# Patient Record
Sex: MALE | Race: WHITE | Employment: OTHER | URBAN - METROPOLITAN AREA
[De-identification: names, ages, dates, MRNs, and addresses within clinical notes are randomized per-mention and may not be internally consistent; named-entity substitution may affect disease eponyms.]

---

## 2017-06-09 ENCOUNTER — HOSPITAL ENCOUNTER (OUTPATIENT)
Dept: SURGERY | Age: 32
Discharge: HOME | End: 2017-06-09

## 2017-06-09 VITALS
SYSTOLIC BLOOD PRESSURE: 142 MMHG | RESPIRATION RATE: 16 BRPM | TEMPERATURE: 97.9 F | OXYGEN SATURATION: 97 % | BODY MASS INDEX: 29.8 KG/M2 | DIASTOLIC BLOOD PRESSURE: 75 MMHG | HEART RATE: 64 BPM | HEIGHT: 72 IN | WEIGHT: 220 LBS

## 2017-06-09 NOTE — PERIOP NOTES
Patient verified name, , and surgery as listed in [facility]. TYPE  CASE:1b  Orders per surgeon: yes Received  Labs per surgeon:none: results -  Labs per anesthesia protocol: none : results -  EKG  :  na      Patient provided with handouts including guide to surgery , transfusions, pain management and hand hygiene for the family and community. Pt verbalizes understanding of all pre-op instructions . Instructed that family must be present in building at all times. Nothing to eat or drink after midnight the night prior to surgery. Hibiclens and instructions given per hospital policy. Instructed patient to continue  previous medications as prescribed prior to surgery and  to take the following medications the day of surgery according to anesthesia guidelines : none       Original medication prescription bottles none visualized during patient appointment. Continue all previous medications unless otherwise directed. Instructed patient to hold  the following medications prior to surgery: none      Patient verbalized understanding of all instructions and provided all medical/health information to the best of their ability.

## 2017-06-12 ENCOUNTER — ANESTHESIA EVENT (OUTPATIENT)
Dept: SURGERY | Age: 32
End: 2017-06-12
Payer: SELF-PAY

## 2017-06-13 ENCOUNTER — ANESTHESIA (OUTPATIENT)
Dept: SURGERY | Age: 32
End: 2017-06-13
Payer: SELF-PAY

## 2017-06-13 ENCOUNTER — HOSPITAL ENCOUNTER (OUTPATIENT)
Age: 32
Setting detail: OUTPATIENT SURGERY
Discharge: HOME | End: 2017-06-13
Attending: OPHTHALMOLOGY | Admitting: OPHTHALMOLOGY
Payer: SELF-PAY

## 2017-06-13 VITALS
HEART RATE: 78 BPM | SYSTOLIC BLOOD PRESSURE: 131 MMHG | OXYGEN SATURATION: 97 % | BODY MASS INDEX: 29.97 KG/M2 | RESPIRATION RATE: 15 BRPM | WEIGHT: 221 LBS | DIASTOLIC BLOOD PRESSURE: 73 MMHG | TEMPERATURE: 97.8 F

## 2017-06-13 PROCEDURE — 77030020143 HC AIRWY LARYN INTUB CGAS -A: Performed by: ANESTHESIOLOGY

## 2017-06-13 PROCEDURE — 74011250636 HC RX REV CODE- 250/636

## 2017-06-13 PROCEDURE — 77030008547 HC TBNG OPHTH BD -A: Performed by: OPHTHALMOLOGY

## 2017-06-13 PROCEDURE — 74011000250 HC RX REV CODE- 250: Performed by: OPHTHALMOLOGY

## 2017-06-13 PROCEDURE — 77030018837 HC SOL IRR OPTH ALCN -A: Performed by: OPHTHALMOLOGY

## 2017-06-13 PROCEDURE — 77030032623 HC KT VITRCMY TOT PLS ALCN -F: Performed by: OPHTHALMOLOGY

## 2017-06-13 PROCEDURE — 76060000033 HC ANESTHESIA 1 TO 1.5 HR: Performed by: OPHTHALMOLOGY

## 2017-06-13 PROCEDURE — 74011000250 HC RX REV CODE- 250

## 2017-06-13 PROCEDURE — 77030012829 HC CAUT BPLR KIRW -B: Performed by: OPHTHALMOLOGY

## 2017-06-13 PROCEDURE — 74011250637 HC RX REV CODE- 250/637: Performed by: ANESTHESIOLOGY

## 2017-06-13 PROCEDURE — 77030018838 HC SOL IRR OPTH ALCN -B: Performed by: OPHTHALMOLOGY

## 2017-06-13 PROCEDURE — 77030017621 HC NDL OPHTH1 ALCN -B: Performed by: OPHTHALMOLOGY

## 2017-06-13 PROCEDURE — 76210000063 HC OR PH I REC FIRST 0.5 HR: Performed by: OPHTHALMOLOGY

## 2017-06-13 PROCEDURE — 77030018846 HC SOL IRR STRL H20 ICUM -A: Performed by: OPHTHALMOLOGY

## 2017-06-13 PROCEDURE — 76210000020 HC REC RM PH II FIRST 0.5 HR: Performed by: OPHTHALMOLOGY

## 2017-06-13 PROCEDURE — 74011250636 HC RX REV CODE- 250/636: Performed by: OPHTHALMOLOGY

## 2017-06-13 PROCEDURE — 76010000161 HC OR TIME 1 TO 1.5 HR INTENSV-TIER 1: Performed by: OPHTHALMOLOGY

## 2017-06-13 PROCEDURE — 77030032490 HC SLV COMPR SCD KNE COVD -B: Performed by: OPHTHALMOLOGY

## 2017-06-13 PROCEDURE — 74011250636 HC RX REV CODE- 250/636: Performed by: ANESTHESIOLOGY

## 2017-06-13 RX ORDER — SODIUM CHLORIDE 9 MG/ML
INJECTION INTRAMUSCULAR; INTRAVENOUS; SUBCUTANEOUS AS NEEDED
Status: DISCONTINUED | OUTPATIENT
Start: 2017-06-13 | End: 2017-06-13 | Stop reason: HOSPADM

## 2017-06-13 RX ORDER — CYCLOPENTOLATE HYDROCHLORIDE 20 MG/ML
1 SOLUTION/ DROPS OPHTHALMIC
Status: DISCONTINUED | OUTPATIENT
Start: 2017-06-13 | End: 2017-06-13 | Stop reason: HOSPADM

## 2017-06-13 RX ORDER — LIDOCAINE HYDROCHLORIDE 20 MG/ML
INJECTION, SOLUTION INFILTRATION; PERINEURAL AS NEEDED
Status: DISCONTINUED | OUTPATIENT
Start: 2017-06-13 | End: 2017-06-13 | Stop reason: HOSPADM

## 2017-06-13 RX ORDER — PHENYLEPHRINE HYDROCHLORIDE 25 MG/ML
1 SOLUTION/ DROPS OPHTHALMIC
Status: DISCONTINUED | OUTPATIENT
Start: 2017-06-13 | End: 2017-06-13 | Stop reason: HOSPADM

## 2017-06-13 RX ORDER — OFLOXACIN 3 MG/ML
1 SOLUTION/ DROPS OPHTHALMIC
Status: DISCONTINUED | OUTPATIENT
Start: 2017-06-13 | End: 2017-06-13 | Stop reason: HOSPADM

## 2017-06-13 RX ORDER — BETAMETHASONE SODIUM PHOSPHATE AND BETAMETHASONE ACETATE 3; 3 MG/ML; MG/ML
INJECTION, SUSPENSION INTRA-ARTICULAR; INTRALESIONAL; INTRAMUSCULAR; SOFT TISSUE AS NEEDED
Status: DISCONTINUED | OUTPATIENT
Start: 2017-06-13 | End: 2017-06-13 | Stop reason: HOSPADM

## 2017-06-13 RX ORDER — TROPICAMIDE 10 MG/ML
1 SOLUTION/ DROPS OPHTHALMIC
Status: DISCONTINUED | OUTPATIENT
Start: 2017-06-13 | End: 2017-06-13 | Stop reason: HOSPADM

## 2017-06-13 RX ORDER — PROPOFOL 10 MG/ML
INJECTION, EMULSION INTRAVENOUS AS NEEDED
Status: DISCONTINUED | OUTPATIENT
Start: 2017-06-13 | End: 2017-06-13 | Stop reason: HOSPADM

## 2017-06-13 RX ORDER — KETOROLAC TROMETHAMINE 30 MG/ML
30 INJECTION, SOLUTION INTRAMUSCULAR; INTRAVENOUS AS NEEDED
Status: DISCONTINUED | OUTPATIENT
Start: 2017-06-13 | End: 2017-06-13 | Stop reason: HOSPADM

## 2017-06-13 RX ORDER — LIDOCAINE HYDROCHLORIDE 10 MG/ML
0.1 INJECTION INFILTRATION; PERINEURAL AS NEEDED
Status: DISCONTINUED | OUTPATIENT
Start: 2017-06-13 | End: 2017-06-13 | Stop reason: HOSPADM

## 2017-06-13 RX ORDER — ONDANSETRON 2 MG/ML
INJECTION INTRAMUSCULAR; INTRAVENOUS AS NEEDED
Status: DISCONTINUED | OUTPATIENT
Start: 2017-06-13 | End: 2017-06-13 | Stop reason: HOSPADM

## 2017-06-13 RX ORDER — HYDROMORPHONE HYDROCHLORIDE 2 MG/ML
0.5 INJECTION, SOLUTION INTRAMUSCULAR; INTRAVENOUS; SUBCUTANEOUS
Status: DISCONTINUED | OUTPATIENT
Start: 2017-06-13 | End: 2017-06-13 | Stop reason: HOSPADM

## 2017-06-13 RX ORDER — CEFAZOLIN SODIUM 1 G/3ML
INJECTION, POWDER, FOR SOLUTION INTRAMUSCULAR; INTRAVENOUS AS NEEDED
Status: DISCONTINUED | OUTPATIENT
Start: 2017-06-13 | End: 2017-06-13 | Stop reason: HOSPADM

## 2017-06-13 RX ORDER — NALOXONE HYDROCHLORIDE 0.4 MG/ML
0.1 INJECTION, SOLUTION INTRAMUSCULAR; INTRAVENOUS; SUBCUTANEOUS AS NEEDED
Status: DISCONTINUED | OUTPATIENT
Start: 2017-06-13 | End: 2017-06-13 | Stop reason: HOSPADM

## 2017-06-13 RX ORDER — FENTANYL CITRATE 50 UG/ML
INJECTION, SOLUTION INTRAMUSCULAR; INTRAVENOUS AS NEEDED
Status: DISCONTINUED | OUTPATIENT
Start: 2017-06-13 | End: 2017-06-13 | Stop reason: HOSPADM

## 2017-06-13 RX ORDER — SODIUM CHLORIDE 0.9 % (FLUSH) 0.9 %
5-10 SYRINGE (ML) INJECTION AS NEEDED
Status: DISCONTINUED | OUTPATIENT
Start: 2017-06-13 | End: 2017-06-13 | Stop reason: HOSPADM

## 2017-06-13 RX ORDER — BUPIVACAINE HYDROCHLORIDE 5 MG/ML
INJECTION, SOLUTION EPIDURAL; INTRACAUDAL AS NEEDED
Status: DISCONTINUED | OUTPATIENT
Start: 2017-06-13 | End: 2017-06-13 | Stop reason: HOSPADM

## 2017-06-13 RX ORDER — SODIUM CHLORIDE 0.9 % (FLUSH) 0.9 %
5-10 SYRINGE (ML) INJECTION EVERY 8 HOURS
Status: DISCONTINUED | OUTPATIENT
Start: 2017-06-13 | End: 2017-06-13 | Stop reason: HOSPADM

## 2017-06-13 RX ORDER — SODIUM CHLORIDE, SODIUM LACTATE, POTASSIUM CHLORIDE, CALCIUM CHLORIDE 600; 310; 30; 20 MG/100ML; MG/100ML; MG/100ML; MG/100ML
125 INJECTION, SOLUTION INTRAVENOUS CONTINUOUS
Status: DISCONTINUED | OUTPATIENT
Start: 2017-06-13 | End: 2017-06-13 | Stop reason: HOSPADM

## 2017-06-13 RX ORDER — LIDOCAINE HYDROCHLORIDE 20 MG/ML
INJECTION, SOLUTION EPIDURAL; INFILTRATION; INTRACAUDAL; PERINEURAL AS NEEDED
Status: DISCONTINUED | OUTPATIENT
Start: 2017-06-13 | End: 2017-06-13 | Stop reason: HOSPADM

## 2017-06-13 RX ORDER — OXYCODONE HYDROCHLORIDE 5 MG/1
10 TABLET ORAL
Status: DISCONTINUED | OUTPATIENT
Start: 2017-06-13 | End: 2017-06-13 | Stop reason: HOSPADM

## 2017-06-13 RX ORDER — PREDNISOLONE ACETATE 10 MG/ML
SUSPENSION/ DROPS OPHTHALMIC AS NEEDED
Status: DISCONTINUED | OUTPATIENT
Start: 2017-06-13 | End: 2017-06-13 | Stop reason: HOSPADM

## 2017-06-13 RX ORDER — MIDAZOLAM HYDROCHLORIDE 1 MG/ML
2 INJECTION, SOLUTION INTRAMUSCULAR; INTRAVENOUS
Status: DISCONTINUED | OUTPATIENT
Start: 2017-06-13 | End: 2017-06-13 | Stop reason: HOSPADM

## 2017-06-13 RX ORDER — ACETAMINOPHEN 500 MG
1000 TABLET ORAL ONCE
Status: DISCONTINUED | OUTPATIENT
Start: 2017-06-13 | End: 2017-06-13 | Stop reason: HOSPADM

## 2017-06-13 RX ADMIN — FENTANYL CITRATE 50 MCG: 50 INJECTION, SOLUTION INTRAMUSCULAR; INTRAVENOUS at 08:28

## 2017-06-13 RX ADMIN — FENTANYL CITRATE 25 MCG: 50 INJECTION, SOLUTION INTRAMUSCULAR; INTRAVENOUS at 09:00

## 2017-06-13 RX ADMIN — FENTANYL CITRATE 25 MCG: 50 INJECTION, SOLUTION INTRAMUSCULAR; INTRAVENOUS at 08:41

## 2017-06-13 RX ADMIN — ONDANSETRON 4 MG: 2 INJECTION INTRAMUSCULAR; INTRAVENOUS at 09:33

## 2017-06-13 RX ADMIN — OXYCODONE HYDROCHLORIDE 10 MG: 5 TABLET ORAL at 10:21

## 2017-06-13 RX ADMIN — SODIUM CHLORIDE, SODIUM LACTATE, POTASSIUM CHLORIDE, AND CALCIUM CHLORIDE 125 ML/HR: 600; 310; 30; 20 INJECTION, SOLUTION INTRAVENOUS at 07:00

## 2017-06-13 RX ADMIN — OFLOXACIN 1 DROP: 3 SOLUTION OPHTHALMIC at 06:59

## 2017-06-13 RX ADMIN — LIDOCAINE HYDROCHLORIDE 80 MG: 20 INJECTION, SOLUTION EPIDURAL; INFILTRATION; INTRACAUDAL; PERINEURAL at 08:28

## 2017-06-13 RX ADMIN — CYCLOPENTOLATE HYDROCHLORIDE 1 DROP: 20 SOLUTION/ DROPS OPHTHALMIC at 06:59

## 2017-06-13 RX ADMIN — PROPOFOL 250 MG: 10 INJECTION, EMULSION INTRAVENOUS at 08:29

## 2017-06-13 RX ADMIN — PHENYLEPHRINE HYDROCHLORIDE 1 DROP: 25 SOLUTION/ DROPS OPHTHALMIC at 06:59

## 2017-06-13 RX ADMIN — TROPICAMIDE 1 DROP: 10 SOLUTION/ DROPS OPHTHALMIC at 06:59

## 2017-06-13 NOTE — IP AVS SNAPSHOT
Summary of Care Report The Summary of Care report has been created to help improve care coordination. Users with access to [employer] or [address] (Web-based application) may access additional patient information including the Discharge Summary. If you are not currently a [address] user and need more information, please call the number listed below in the [place] [address] section and ask to be connected with Medical Records. Facility Information Name Address Phone [address] [address] [address] [phone] Patient Information Patient Name Sex THUY Perdomo ([id number]) Male 1985 Discharge Information Admitting Provider Service Area Unit No Marte MD / [facility] Liliane / [phone] Discharge Provider Discharge Date/Time Discharge Disposition Destination (none) (none) (none) (none) Patient Language Language ENGLISH [13] Hospital Problems as of 2017  Reviewed: 2017  6:48 AM by Garett Reyes MD  
 None Non-Hospital Problems as of 2017  Reviewed: 2017  6:48 AM by Garett Reyes MD  
 None You are allergic to the following Allergen Reactions Amoxil (Amoxicillin) Nausea and Vomiting Current Discharge Medication List  
  
Notice You have not been prescribed any medications. Surgery Information ID Date/Time Status Primary Surgeon All Procedures Location [id number] 2017 0830 Unposted Jaida Garcia III, MD RIGHT VITRECTOMY POSTERIOR 22 GAUGE / LASER/ SCAR TISSUE REMOVAL/ GAS FLUID EXCHANGE/COMPLEX RETINAL DETACHMENT REPAIR [facility] Follow-up Information None Discharge Instructions [facility] [place], [place] 
 Hortense Butte Marjean Krabbe, MD Lillia Monks Renfro, MD Octaviano Eagles. MD Morteza Ortiz. Dominick Atkinson, [address] or [phone] or [phone] or [phone] Post Operative Instructions for Retina and Vitreous Surgery Patients The following are a few guidelines that you should observe for two weeks after surgery: 1. Avoid stooping over, lifting heavy weights or bumping your head. 2.  Special positioning: MAY SIT UP, BUT KEEP HEAD DOWN. SLEEP ON LEFT SIDE WITH FACE TURNED DOWN 
 
3. No extensive traveling except what is necessary. A gas air bubble was put in your eye at surgery - avoid air travel until you consult your doctor. 4.  You may shave after the third day. 5.  You may watch television, read, cook and wash dishes as long as it does not interfere 
      with special positioning instructions. 6.  Alcoholic beverages may be used in moderation. 7.  No sexual intercourse. 8.  You  may bathe the eyelids daily with cotton or a tissue moistened with warm tap 
     water. Do not bathe the eyeball itself. 9.  Some discharge from the operated eye is to be expected. This should gradually  
     improve. 10. Change the eye pad at least once daily. You may discontinue the eye pad whenever 
      comfortable to do so (usually three days after the operation). Wear the eye shield or 
      glasses at all times. 11. If you experience increasing pain, decreasing vision, or pus like discharge, call the 
      Office. 12. Medication Instructions: 
       Prednisolone 1% - one drop in the operated eye __4__ times a day. _Ofloxacin_(antibiotic drop) - one drop in operated eye 4 times a day. BRING ALL EYE DROPS TO YOU POSTOPERATIVE APPOINTMENT! 13. Return appointment at the [facility] on: 6/14/17 at 8:05AM w/ Dr. Jerome Camarillo Voiced or demonstrated understanding:  Aruna Hussein from Nurse PATIENT INSTRUCTIONS: 
 
After general anesthesia or intravenous sedation, for 24 hours or while taking prescription Narcotics: · Limit your activities · Do not drive and operate hazardous machinery · Do not make important personal or business decisions · Do  not drink alcoholic beverages · If you have not urinated within 8 hours after discharge, please contact your surgeon on call. *  Please give a list of your current medications to your Primary Care Provider. *  Please update this list whenever your medications are discontinued, doses are 
    changed, or new medications (including over-the-counter products) are added. *  Please carry medication information at all times in case of emergency situations. These are general instructions for a healthy lifestyle: No smoking/ No tobacco products/ Avoid exposure to second hand smoke Surgeon General's Warning:  Quitting smoking now greatly reduces serious risk to your health. Obesity, smoking, and sedentary lifestyle greatly increases your risk for illness A healthy diet, regular physical exercise & weight monitoring are important for maintaining a healthy lifestyle You may be retaining fluid if you have a history of heart failure or if you experience any of the following symptoms:  Weight gain of 3 pounds or more overnight or 5 pounds in a week, increased swelling in our hands or feet or shortness of breath while lying flat in bed. Please call your doctor as soon as you notice any of these symptoms; do not wait until your next office visit. Recognize signs and symptoms of STROKE: 
 
F-face looks uneven A-arms unable to move or move unevenly S-speech slurred or non-existent T-time-call 911 as soon as signs and symptoms begin-DO NOT go Back to bed or wait to see if you get better-TIME IS BRAIN. Chart Review Routing History No Routing History on File

## 2017-06-13 NOTE — BRIEF OP NOTE
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, right eye [H33.021] proliferative retinopathy right eye    Procedure(s):  RIGHT VITRECTOMY POSTERIOR 25 GAUGE / LASER/ SCAR TISSUE REMOVAL/ GAS FLUID EXCHANGE/COMPLEX RETINAL DETACHMENT REPAIR  Surgeon(s) and Role:     * Curtis Kemp III, MD - Primary         Assistant Staff:       Surgical Staff:  Circ-1: Gerson Osborne RN  Scrub Tech-1: Kimberly Lemons  Event Time In   Incision Start 5310   Incision Close 8100     Anesthesia: General   Estimated Blood Loss: None  Specimens: * No specimens in log *   Findings: Chronic inferior retinal detachment with multiple radial subretinal fibrotic bands right eye   Complications: None  Implants: * No implants in log *

## 2017-06-13 NOTE — IP AVS SNAPSHOT
Yumiko Foil 
 
 
 [address] [facility] 
[phone] Patient: Raghu Clark MRN: [id number] :1985 You are allergic to the following Allergen Reactions Amoxil (Amoxicillin) Nausea and Vomiting Recent Documentation Weight BMI Smoking Status  
  
  
  
 100.2 kg 29.97 kg/m2 Current Every Day Smoker Emergency Contacts Name Discharge Info Relation Home Work Mobile [address]  Parent [1] [phone] About your hospitalization You were admitted on:  2017 You last received care in the:  [place] [address] You were discharged on:  2017 Unit phone number:  [phone] Why you were hospitalized Your primary diagnosis was:  Not on File Providers Seen During Your Hospitalizations Provider Role Specialty Primary office phone Rogelia Goldberg, MD Attending Provider Ophthalmology [phone] Your Primary Care Physician (PCP) Primary Care Physician Office Phone Office Fax OTHER, PHYS ** None ** ** None ** Follow-up Information None Current Discharge Medication List  
  
Notice You have not been prescribed any medications. Discharge Instructions [facility] [place] MD Dereck Riggs MD Marygrace Belling. MD Jean Marie Martinez. Nitin Og, [address] or [phone] or [phone] or [phone] Post Operative Instructions for Retina and Vitreous Surgery Patients The following are a few guidelines that you should observe for two weeks after surgery: 1. Avoid stooping over, lifting heavy weights or bumping your head. 2.  Special positioning: MAY SIT UP, BUT KEEP HEAD DOWN.  SLEEP ON LEFT SIDE WITH FACE TURNED DOWN 
 
 3.  No extensive traveling except what is necessary. A gas air bubble was put in your eye at surgery - avoid air travel until you consult your doctor. 4.  You may shave after the third day. 5.  You may watch television, read, cook and wash dishes as long as it does not interfere 
      with special positioning instructions. 6.  Alcoholic beverages may be used in moderation. 7.  No sexual intercourse. 8.  You  may bathe the eyelids daily with cotton or a tissue moistened with warm tap 
     water. Do not bathe the eyeball itself. 9.  Some discharge from the operated eye is to be expected. This should gradually  
     improve. 10. Change the eye pad at least once daily. You may discontinue the eye pad whenever 
      comfortable to do so (usually three days after the operation). Wear the eye shield or 
      glasses at all times. 11. If you experience increasing pain, decreasing vision, or pus like discharge, call the 
      Office. 12. Medication Instructions: 
       Prednisolone 1% - one drop in the operated eye __4__ times a day. _Ofloxacin_(antibiotic drop) - one drop in operated eye 4 times a day. BRING ALL EYE DROPS TO YOU POSTOPERATIVE APPOINTMENT! 13. Return appointment at the [facility] on: 6/14/17 at 8:05AM w/ Dr. Munir Zaragoza Voiced or demonstrated understanding:  Nakita Chambers from Nurse PATIENT INSTRUCTIONS: 
 
After general anesthesia or intravenous sedation, for 24 hours or while taking prescription Narcotics: · Limit your activities · Do not drive and operate hazardous machinery · Do not make important personal or business decisions · Do  not drink alcoholic beverages · If you have not urinated within 8 hours after discharge, please contact your surgeon on call. *  Please give a list of your current medications to your Primary Care Provider.  
 
*  Please update this list whenever your medications are discontinued, doses are 
    changed, or new medications (including over-the-counter products) are added. *  Please carry medication information at all times in case of emergency situations. These are general instructions for a healthy lifestyle: No smoking/ No tobacco products/ Avoid exposure to second hand smoke Surgeon General's Warning:  Quitting smoking now greatly reduces serious risk to your health. Obesity, smoking, and sedentary lifestyle greatly increases your risk for illness A healthy diet, regular physical exercise & weight monitoring are important for maintaining a healthy lifestyle You may be retaining fluid if you have a history of heart failure or if you experience any of the following symptoms:  Weight gain of 3 pounds or more overnight or 5 pounds in a week, increased swelling in our hands or feet or shortness of breath while lying flat in bed. Please call your doctor as soon as you notice any of these symptoms; do not wait until your next office visit. Recognize signs and symptoms of STROKE: 
 
F-face looks uneven A-arms unable to move or move unevenly S-speech slurred or non-existent T-time-call 911 as soon as signs and symptoms begin-DO NOT go Back to bed or wait to see if you get better-TIME IS BRAIN. Discharge Orders None Introducing [facility]! Brian Tomas introduces [email or web address] patient portal. Now you can access parts of your medical record, email your doctor's office, and request medication refills online. 1. In your internet browser, go to [email or web address] 2. Click on the First Time User? Click Here link in the Sign In box. You will see the New Member Sign Up page. 3. Enter your [email or web address] Access Code exactly as it appears below. You will not need to use this code after youve completed the sign-up process. If you do not sign up before the expiration date, you must request a new code. · [employer] Access Code: [email or web address] Expires: 9/4/2017  9:27 AM 
 
4. Enter the last four digits of your Social Security Number (xxxx) and Date of Birth (mm/dd/yyyy) as indicated and click Submit. You will be taken to the next sign-up page. 5. Create a [employer] ID. This will be your [employer] login ID and cannot be changed, so think of one that is secure and easy to remember. 6. Create a [employer] password. You can change your password at any time. 7. Enter your Password Reset Question and Answer. This can be used at a later time if you forget your password. 8. Enter your e-mail address. You will receive e-mail notification when new information is available in [address] 9. Click Sign Up. You can now view and download portions of your medical record. 10. Click the Download Summary menu link to download a portable copy of your medical information. If you have questions, please visit the Frequently Asked Questions section of the [employer] website. Remember, [employer] is NOT to be used for urgent needs. For medical emergencies, dial 911. Now available from your iPhone and Android! General Information Please provide this summary of care documentation to your next provider. Patient Signature:  ____________________________________________________________ Date:  ____________________________________________________________  
  
Yvette Reyna Provider Signature:  ____________________________________________________________ Date:  ____________________________________________________________

## 2017-06-13 NOTE — ANESTHESIA POSTPROCEDURE EVALUATION
Post-Anesthesia Evaluation and Assessment    Patient: Mindi Concepcion MRN: [id number]  SSN: [id number]    YOB: 1985  Age: 28 y.o. Sex: male       Cardiovascular Function/Vital Signs  Visit Vitals    /77    Pulse 76    Temp 36.6 °C (97.8 °F)    Resp 16    Wt 100.2 kg (221 lb)    SpO2 96%    BMI 29.97 kg/m2       Patient is status post general anesthesia for Procedure(s):  RIGHT VITRECTOMY POSTERIOR 25 GAUGE / LASER/ SCAR TISSUE REMOVAL/ GAS FLUID EXCHANGE/COMPLEX RETINAL DETACHMENT REPAIR. Nausea/Vomiting: None    Postoperative hydration reviewed and adequate. Pain:  Pain Scale 1: Numeric (0 - 10) (06/13/17 1011)  Pain Intensity 1: 6 (06/13/17 1011)   Managed    Neurological Status:   Neuro (WDL): Within Defined Limits (06/13/17 1011)  Neuro  Neurologic State: Alert (06/13/17 1011)  LUE Motor Response: Purposeful (06/13/17 1011)  LLE Motor Response: Purposeful (06/13/17 1011)  RUE Motor Response: Purposeful (06/13/17 1011)  RLE Motor Response: Purposeful (06/13/17 1011)   At baseline    Mental Status and Level of Consciousness: Arousable    Pulmonary Status:   O2 Device: Room air (06/13/17 1021)   Adequate oxygenation and airway patent    Complications related to anesthesia: None    Post-anesthesia assessment completed.  No concerns    Signed By: Angel Meadows MD     June 13, 2017

## 2017-06-13 NOTE — OP NOTES
[address]   OPERATIVE REPORT       Name:  Ivan Boyd   MR#:  [id number]   :  1985   Account #:  [de-identified]   Date of Adm:  2017       DATE OF SURGERY: 2017     PREOPERATIVE DIAGNOSES:   1. 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   2. 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 POSTOPERATIVE DIAGNOSES:    1. 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   2. 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 PROCEDURES PERFORMED: Repair of complex retinal detachment,   right eye by vitrectomy, epiretinal and subretinal membrane   removal, gas fluid exchange, and laser. SURGEON: Melita Watson. GEORGE Garcia MD.    ANESTHESIA: General.    COMPLICATIONS: None. INDICATIONS FOR SURGERY: The patient has experienced loss of   vision of the right eye associated with a chronic retinal   detachment with proliferative vitreoretinopathy. Surgery was   recommended to repair the retinal detachment in order to restore   vision. The risks and benefits of the surgical procedure were   thoroughly reviewed and the patient wished to proceed. SUMMARY OF PROCEDURE: After appropriate preoperative evaluation   and signing of the operative permit, the patient was taken to   the operating room and placed in supine fashion upon the   procedure table. A time-out was then performed and all operating   room personnel confirmed the patient's identity, the surgical   site, and the procedures to be performed. The patient was then   placed under general anesthesia and a retrobulbar injection was   given on the right consisting of 5 mL of a 50/50 solution of 2%   Xylocaine and 0.75% Marcaine. The right eye was then carefully   draped and prepped in sterile ophthalmic fashion with a 5%   Betadine solution. The conjunctiva and periorbital tissues were   carefully prepped.     A 25-gauge 4 mm infusion cannula was carefully placed 4 mm   posterior to limbus at the 8 o'clock position. Once the tip of   the cannula was visualized and posterior segment found to be   clear, it was turned to the on position and the intraocular   pressure was adjusted to 30 mmHg. Two additional cannulas were   placed at the 10 and 2 o'clock positions, 4 mm posterior to the   limbus. The handheld fiberoptic tube was inserted in the nasal cannula   site while the Microvit instrument was inserted in the temporal   cannula site. Visualization of the posterior segment was   achieved using the overhead microscope and the hand-held contact   lens. Visualization of the posterior segment confirmed a retinal   detachment extending from the 3 o'clock to the 8 o'clock   positions. Radial subretinal bands were present at the 3 and 5   o'clock positions. Also, subretinal fibrosis was present in the   superior peripapillary area and the temporal macular region. Significant RPE changes were present in the central macula with   low lying subretinal fluid. A previous scleral buckle was in   place with cryopexy at the 5 and 6 o'clock positions. A thorough central vitrectomy was then performed with the   [address] [facility]. Care was taken to trim the vitreous to the far   periphery in all quadrants. All vitreoretinal traction was   carefully removed. In order to relieve the tractional component   of the retinal detachment, a small retinotomy was fashioned at   the 3 o'clock position and the subretinal band was transected. The majority of the subretinal band was also removed with   serrated forceps. At the 5 o'clock position, the tractional   component was carefully relieved by transecting the subretinal   and intraretinal band on the scleral buckle at the 5 o'clock   position. This released the radial traction. Additional   subretinal fibrosis was carefully removed.      An air-fluid exchange was then performed and subretinal fluid was   carefully drained through the retinotomy site at the 3 o'clock   position and additionally at the 5 o'clock position. The retina   was noted to lie flat against the retinal pigment epithelium. The indirect laser was then used to apply laser treatment around   the retinotomy site and the area of retinectomy at the 5 o'clock   position. It should be noted an atrophic retinal break was   present within the area of fibrosis at the 5 o'clock position   prior to performing the retinectomy. A total of 1433 spots were   placed. All instruments were then removed from the eye and a 20%   solution of SF6 gas was infused through the infusion cannula. This was vented out the superior temporal cannula site. All   cannulas were then removed and no leakage was noted from the   sites. A subconjunctival injection consisting of 0.5 mL of Ancef   and 0.5 mL of betamethasone was given in the inferior temporal   quadrant. Pred Forte, Vigamox, and Cyclogyl were placed in the   inferior fornix. Patch and shield were then placed over the   right eye. The patient tolerated the procedure well and was taken to the   recovery room for routine postoperative care.         MD Erna Strange III / [place]   D:  06/13/2017   09:55   T:  06/13/2017   11:29   Job #:  [id number]

## 2017-06-13 NOTE — DISCHARGE INSTRUCTIONS
[facility], [address] [facility] MD Diane Maurice MD Berline Arms. Johny Remedies, MD Kern Jaffe. Naoma Jeans, MD    [phone] or [phone] or [phone] or [phone]    Post Operative Instructions for Retina and Vitreous Surgery Patients    The following are a few guidelines that you should observe for two weeks after surgery:    1. Avoid stooping over, lifting heavy weights or bumping your head. 2.  Special positioning: MAY SIT UP, BUT KEEP HEAD DOWN. SLEEP ON LEFT SIDE WITH FACE TURNED DOWN    3. No extensive traveling except what is necessary. A gas air bubble was put in your eye at surgery - avoid air travel until you consult your doctor. 4.  You may shave after the third day. 5.  You may watch television, read, cook and wash dishes as long as it does not interfere        with special positioning instructions. 6.  Alcoholic beverages may be used in moderation. 7.  No sexual intercourse. 8.  You  may bathe the eyelids daily with cotton or a tissue moistened with warm tap       water. Do not bathe the eyeball itself. 9.  Some discharge from the operated eye is to be expected. This should gradually        improve. 10. Change the eye pad at least once daily. You may discontinue the eye pad whenever        comfortable to do so (usually three days after the operation). Wear the eye shield or        glasses at all times. 11. If you experience increasing pain, decreasing vision, or pus like discharge, call the        Office. 12. Medication Instructions:         Prednisolone 1% - one drop in the operated eye __4__ times a day. _Ofloxacin_(antibiotic drop) - one drop in operated eye 4 times a day. BRING ALL EYE DROPS TO YOU POSTOPERATIVE APPOINTMENT!     13. Return appointment at the [facility] on: 6/14/17 at 8:05AM w/  Radha    Voiced or demonstrated understanding:  YES    DISCHARGE SUMMARY from Nurse    PATIENT INSTRUCTIONS:    After general anesthesia or intravenous sedation, for 24 hours or while taking prescription Narcotics:  · Limit your activities  · Do not drive and operate hazardous machinery  · Do not make important personal or business decisions  · Do  not drink alcoholic beverages  · If you have not urinated within 8 hours after discharge, please contact your surgeon on call. *  Please give a list of your current medications to your Primary Care Provider. *  Please update this list whenever your medications are discontinued, doses are      changed, or new medications (including over-the-counter products) are added. *  Please carry medication information at all times in case of emergency situations. These are general instructions for a healthy lifestyle:    No smoking/ No tobacco products/ Avoid exposure to second hand smoke    Surgeon General's Warning:  Quitting smoking now greatly reduces serious risk to your health. Obesity, smoking, and sedentary lifestyle greatly increases your risk for illness    A healthy diet, regular physical exercise & weight monitoring are important for maintaining a healthy lifestyle    You may be retaining fluid if you have a history of heart failure or if you experience any of the following symptoms:  Weight gain of 3 pounds or more overnight or 5 pounds in a week, increased swelling in our hands or feet or shortness of breath while lying flat in bed. Please call your doctor as soon as you notice any of these symptoms; do not wait until your next office visit. Recognize signs and symptoms of STROKE:    F-face looks uneven    A-arms unable to move or move unevenly    S-speech slurred or non-existent    T-time-call 911 as soon as signs and symptoms begin-DO NOT go       Back to bed or wait to see if you get better-TIME IS BRAIN.

## 2017-06-13 NOTE — ANESTHESIA PREPROCEDURE EVALUATION
Anesthetic History               Review of Systems / Medical History  Patient summary reviewed, nursing notes reviewed and pertinent labs reviewed    Pulmonary    COPD (Chronic bronchitis)      Smoker         Neuro/Psych              Cardiovascular                  Exercise tolerance: >4 METS     GI/Hepatic/Renal                Endo/Other             Other Findings              Physical Exam    Airway  Mallampati: I  TM Distance: > 6 cm  Neck ROM: normal range of motion   Mouth opening: Normal     Cardiovascular  Regular rate and rhythm,  S1 and S2 normal,  no murmur, click, rub, or gallop             Dental      Comments:  Many missing teeth   Pulmonary  Breath sounds clear to auscultation               Abdominal  GI exam deferred       Other Findings            Anesthetic Plan    ASA: 2  Anesthesia type: general            Anesthetic plan and risks discussed with: Patient and Mother    Girlfriend present

## 2017-06-13 NOTE — PROGRESS NOTES
Spiritual Care visit. Initial Visit, Pre Surgery Consult. Visit and prayer before patient goes to surgery.     Visit by Navdeep Beltran M.Ed., Th.B. ,Staff

## 2017-06-23 ENCOUNTER — HOSPITAL ENCOUNTER (OUTPATIENT)
Dept: SURGERY | Age: 32
Discharge: HOME | End: 2017-06-23
Payer: SELF-PAY

## 2017-06-23 VITALS
BODY MASS INDEX: 29.67 KG/M2 | DIASTOLIC BLOOD PRESSURE: 79 MMHG | TEMPERATURE: 97.5 F | WEIGHT: 219.06 LBS | HEIGHT: 72 IN | HEART RATE: 61 BPM | RESPIRATION RATE: 16 BRPM | SYSTOLIC BLOOD PRESSURE: 148 MMHG | OXYGEN SATURATION: 99 %

## 2017-06-23 LAB — HGB BLD-MCNC: 16.1 G/DL (ref 13.6–17.2)

## 2017-06-23 PROCEDURE — 85018 HEMOGLOBIN: CPT | Performed by: ANESTHESIOLOGY

## 2017-06-23 RX ORDER — HYDROCODONE BITARTRATE AND ACETAMINOPHEN 5; 325 MG/1; MG/1
2 TABLET ORAL
COMMUNITY

## 2017-06-23 NOTE — PERIOP NOTES
Patient verified name, , and surgery as listed in [facility]. TYPE  CASE:ll  Orders per surgeon: were Received  Labs per surgeon:none  Labs per anesthesia protocol: hgb collected and results are in [facility]   EKG  :  EKG is not required per protocol      Patient provided with handouts including guide to surgery , transfusions, pain management and hand hygiene for the family and community. Pt verbalizes understanding of all pre-op instructions . Instructed that family must be present in building at all times. Nothing to eat or drink after midnight the night prior to surgery. Hibiclens and antibacterial soap  instructions given per hospital policy. Instructed patient to continue  previous medications as prescribed prior to surgery and  to take the following medications the day of surgery according to anesthesia guidelines : norco if needed       Original medication prescription bottles were not visualized during patient appointment. Continue all previous medications unless otherwise directed. Instructed patient to hold  the following medications prior to surgery: all vitamins,supplements and nsaids      Patient verbalized understanding of all instructions and provided all medical/health information to the best of their ability.

## 2017-06-26 ENCOUNTER — ANESTHESIA EVENT (OUTPATIENT)
Dept: SURGERY | Age: 32
End: 2017-06-26
Payer: SELF-PAY

## 2017-06-26 ENCOUNTER — HOSPITAL ENCOUNTER (OUTPATIENT)
Age: 32
Setting detail: OUTPATIENT SURGERY
Discharge: HOME | End: 2017-06-26
Attending: OPHTHALMOLOGY | Admitting: OPHTHALMOLOGY
Payer: SELF-PAY

## 2017-06-26 ENCOUNTER — ANESTHESIA (OUTPATIENT)
Dept: SURGERY | Age: 32
End: 2017-06-26
Payer: SELF-PAY

## 2017-06-26 VITALS
WEIGHT: 222.3 LBS | RESPIRATION RATE: 15 BRPM | TEMPERATURE: 98.3 F | BODY MASS INDEX: 30.11 KG/M2 | HEIGHT: 72 IN | OXYGEN SATURATION: 97 % | DIASTOLIC BLOOD PRESSURE: 83 MMHG | SYSTOLIC BLOOD PRESSURE: 127 MMHG | HEART RATE: 69 BPM

## 2017-06-26 PROCEDURE — 77030026083 HC FCPS OPHTH GRSH DSP ALCN -C: Performed by: OPHTHALMOLOGY

## 2017-06-26 PROCEDURE — 74011250636 HC RX REV CODE- 250/636: Performed by: OPHTHALMOLOGY

## 2017-06-26 PROCEDURE — 76060000033 HC ANESTHESIA 1 TO 1.5 HR: Performed by: OPHTHALMOLOGY

## 2017-06-26 PROCEDURE — 77030017621 HC NDL OPHTH1 ALCN -B: Performed by: OPHTHALMOLOGY

## 2017-06-26 PROCEDURE — 76210000021 HC REC RM PH II 0.5 TO 1 HR: Performed by: OPHTHALMOLOGY

## 2017-06-26 PROCEDURE — 74011000250 HC RX REV CODE- 250

## 2017-06-26 PROCEDURE — 74011250636 HC RX REV CODE- 250/636: Performed by: ANESTHESIOLOGY

## 2017-06-26 PROCEDURE — 74011250637 HC RX REV CODE- 250/637: Performed by: OPHTHALMOLOGY

## 2017-06-26 PROCEDURE — 77030002975 HC SUT PLN J&J -B: Performed by: OPHTHALMOLOGY

## 2017-06-26 PROCEDURE — 77030008547 HC TBNG OPHTH BD -A: Performed by: OPHTHALMOLOGY

## 2017-06-26 PROCEDURE — 76010000149 HC OR TIME 1 TO 1.5 HR: Performed by: OPHTHALMOLOGY

## 2017-06-26 PROCEDURE — 77030029406 HC COM VLC OPTH PK ALCN -B: Performed by: OPHTHALMOLOGY

## 2017-06-26 PROCEDURE — 77030018838 HC SOL IRR OPTH ALCN -B: Performed by: OPHTHALMOLOGY

## 2017-06-26 PROCEDURE — 74011250637 HC RX REV CODE- 250/637: Performed by: ANESTHESIOLOGY

## 2017-06-26 PROCEDURE — 77030032623 HC KT VITRCMY TOT PLS ALCN -F: Performed by: OPHTHALMOLOGY

## 2017-06-26 PROCEDURE — 77030020782 HC GWN BAIR PAWS FLX 3M -B: Performed by: ANESTHESIOLOGY

## 2017-06-26 PROCEDURE — 77030012829 HC CAUT BPLR KIRW -B: Performed by: OPHTHALMOLOGY

## 2017-06-26 PROCEDURE — 76210000006 HC OR PH I REC 0.5 TO 1 HR: Performed by: OPHTHALMOLOGY

## 2017-06-26 PROCEDURE — 74011250636 HC RX REV CODE- 250/636

## 2017-06-26 PROCEDURE — 77030003029 HC SUT VCRL J&J -B: Performed by: OPHTHALMOLOGY

## 2017-06-26 PROCEDURE — 77030018837 HC SOL IRR OPTH ALCN -A: Performed by: OPHTHALMOLOGY

## 2017-06-26 PROCEDURE — 74011000250 HC RX REV CODE- 250: Performed by: OPHTHALMOLOGY

## 2017-06-26 PROCEDURE — 77030020143 HC AIRWY LARYN INTUB CGAS -A: Performed by: ANESTHESIOLOGY

## 2017-06-26 RX ORDER — LIDOCAINE HYDROCHLORIDE 20 MG/ML
INJECTION, SOLUTION INFILTRATION; PERINEURAL AS NEEDED
Status: DISCONTINUED | OUTPATIENT
Start: 2017-06-26 | End: 2017-06-26 | Stop reason: HOSPADM

## 2017-06-26 RX ORDER — MIDAZOLAM HYDROCHLORIDE 1 MG/ML
2 INJECTION, SOLUTION INTRAMUSCULAR; INTRAVENOUS ONCE
Status: DISCONTINUED | OUTPATIENT
Start: 2017-06-26 | End: 2017-06-26 | Stop reason: HOSPADM

## 2017-06-26 RX ORDER — MIDAZOLAM HYDROCHLORIDE 1 MG/ML
2 INJECTION, SOLUTION INTRAMUSCULAR; INTRAVENOUS
Status: DISCONTINUED | OUTPATIENT
Start: 2017-06-26 | End: 2017-06-26 | Stop reason: HOSPADM

## 2017-06-26 RX ORDER — LIDOCAINE HYDROCHLORIDE 20 MG/ML
INJECTION, SOLUTION EPIDURAL; INFILTRATION; INTRACAUDAL; PERINEURAL AS NEEDED
Status: DISCONTINUED | OUTPATIENT
Start: 2017-06-26 | End: 2017-06-26 | Stop reason: HOSPADM

## 2017-06-26 RX ORDER — DIPHENHYDRAMINE HYDROCHLORIDE 50 MG/ML
12.5 INJECTION, SOLUTION INTRAMUSCULAR; INTRAVENOUS
Status: DISCONTINUED | OUTPATIENT
Start: 2017-06-26 | End: 2017-06-27 | Stop reason: HOSPADM

## 2017-06-26 RX ORDER — OFLOXACIN 3 MG/ML
3 SOLUTION/ DROPS OPHTHALMIC 4 TIMES DAILY
COMMUNITY

## 2017-06-26 RX ORDER — NALOXONE HYDROCHLORIDE 0.4 MG/ML
0.1 INJECTION, SOLUTION INTRAMUSCULAR; INTRAVENOUS; SUBCUTANEOUS AS NEEDED
Status: DISCONTINUED | OUTPATIENT
Start: 2017-06-26 | End: 2017-06-27 | Stop reason: HOSPADM

## 2017-06-26 RX ORDER — SODIUM CHLORIDE, SODIUM LACTATE, POTASSIUM CHLORIDE, CALCIUM CHLORIDE 600; 310; 30; 20 MG/100ML; MG/100ML; MG/100ML; MG/100ML
100 INJECTION, SOLUTION INTRAVENOUS CONTINUOUS
Status: DISCONTINUED | OUTPATIENT
Start: 2017-06-26 | End: 2017-06-26 | Stop reason: HOSPADM

## 2017-06-26 RX ORDER — SODIUM CHLORIDE, SODIUM LACTATE, POTASSIUM CHLORIDE, CALCIUM CHLORIDE 600; 310; 30; 20 MG/100ML; MG/100ML; MG/100ML; MG/100ML
100 INJECTION, SOLUTION INTRAVENOUS CONTINUOUS
Status: DISCONTINUED | OUTPATIENT
Start: 2017-06-26 | End: 2017-06-27 | Stop reason: HOSPADM

## 2017-06-26 RX ORDER — PREDNISOLONE ACETATE 10 MG/ML
1 SUSPENSION/ DROPS OPHTHALMIC 4 TIMES DAILY
COMMUNITY

## 2017-06-26 RX ORDER — BETAMETHASONE SODIUM PHOSPHATE AND BETAMETHASONE ACETATE 3; 3 MG/ML; MG/ML
INJECTION, SUSPENSION INTRA-ARTICULAR; INTRALESIONAL; INTRAMUSCULAR; SOFT TISSUE AS NEEDED
Status: DISCONTINUED | OUTPATIENT
Start: 2017-06-26 | End: 2017-06-26 | Stop reason: HOSPADM

## 2017-06-26 RX ORDER — PROPOFOL 10 MG/ML
INJECTION, EMULSION INTRAVENOUS AS NEEDED
Status: DISCONTINUED | OUTPATIENT
Start: 2017-06-26 | End: 2017-06-26 | Stop reason: HOSPADM

## 2017-06-26 RX ORDER — BUPIVACAINE HYDROCHLORIDE 5 MG/ML
INJECTION, SOLUTION EPIDURAL; INTRACAUDAL AS NEEDED
Status: DISCONTINUED | OUTPATIENT
Start: 2017-06-26 | End: 2017-06-26 | Stop reason: HOSPADM

## 2017-06-26 RX ORDER — OXYCODONE HYDROCHLORIDE 5 MG/1
10 TABLET ORAL
Status: DISCONTINUED | OUTPATIENT
Start: 2017-06-26 | End: 2017-06-27 | Stop reason: HOSPADM

## 2017-06-26 RX ORDER — PREDNISONE 5 MG/ML
SOLUTION ORAL AS DIRECTED
Status: ON HOLD | COMMUNITY
End: 2017-06-26

## 2017-06-26 RX ORDER — OXYCODONE HYDROCHLORIDE 5 MG/1
5 TABLET ORAL
Status: DISCONTINUED | OUTPATIENT
Start: 2017-06-26 | End: 2017-06-27 | Stop reason: HOSPADM

## 2017-06-26 RX ORDER — OFLOXACIN 3 MG/ML
1 SOLUTION/ DROPS OPHTHALMIC
Status: DISCONTINUED | OUTPATIENT
Start: 2017-06-26 | End: 2017-06-26 | Stop reason: HOSPADM

## 2017-06-26 RX ORDER — CYCLOPENTOLATE HYDROCHLORIDE 20 MG/ML
1 SOLUTION/ DROPS OPHTHALMIC
Status: DISCONTINUED | OUTPATIENT
Start: 2017-06-26 | End: 2017-06-26 | Stop reason: HOSPADM

## 2017-06-26 RX ORDER — ONDANSETRON 2 MG/ML
INJECTION INTRAMUSCULAR; INTRAVENOUS AS NEEDED
Status: DISCONTINUED | OUTPATIENT
Start: 2017-06-26 | End: 2017-06-26 | Stop reason: HOSPADM

## 2017-06-26 RX ORDER — TROPICAMIDE 10 MG/ML
1 SOLUTION/ DROPS OPHTHALMIC
Status: DISCONTINUED | OUTPATIENT
Start: 2017-06-26 | End: 2017-06-26 | Stop reason: HOSPADM

## 2017-06-26 RX ORDER — SODIUM CHLORIDE, SODIUM LACTATE, POTASSIUM CHLORIDE, CALCIUM CHLORIDE 600; 310; 30; 20 MG/100ML; MG/100ML; MG/100ML; MG/100ML
50 INJECTION, SOLUTION INTRAVENOUS CONTINUOUS
Status: DISCONTINUED | OUTPATIENT
Start: 2017-06-26 | End: 2017-06-26 | Stop reason: HOSPADM

## 2017-06-26 RX ORDER — HYDROMORPHONE HYDROCHLORIDE 2 MG/ML
0.5 INJECTION, SOLUTION INTRAMUSCULAR; INTRAVENOUS; SUBCUTANEOUS
Status: DISCONTINUED | OUTPATIENT
Start: 2017-06-26 | End: 2017-06-27 | Stop reason: HOSPADM

## 2017-06-26 RX ORDER — ONDANSETRON 2 MG/ML
4 INJECTION INTRAMUSCULAR; INTRAVENOUS ONCE
Status: COMPLETED | OUTPATIENT
Start: 2017-06-26 | End: 2017-06-26

## 2017-06-26 RX ORDER — LIDOCAINE HYDROCHLORIDE 10 MG/ML
0.1 INJECTION INFILTRATION; PERINEURAL AS NEEDED
Status: DISCONTINUED | OUTPATIENT
Start: 2017-06-26 | End: 2017-06-26 | Stop reason: HOSPADM

## 2017-06-26 RX ORDER — DEXAMETHASONE SODIUM PHOSPHATE 4 MG/ML
INJECTION, SOLUTION INTRA-ARTICULAR; INTRALESIONAL; INTRAMUSCULAR; INTRAVENOUS; SOFT TISSUE AS NEEDED
Status: DISCONTINUED | OUTPATIENT
Start: 2017-06-26 | End: 2017-06-26 | Stop reason: HOSPADM

## 2017-06-26 RX ORDER — FENTANYL CITRATE 50 UG/ML
100 INJECTION, SOLUTION INTRAMUSCULAR; INTRAVENOUS ONCE
Status: DISCONTINUED | OUTPATIENT
Start: 2017-06-26 | End: 2017-06-26 | Stop reason: HOSPADM

## 2017-06-26 RX ORDER — CEFAZOLIN SODIUM 1 G/3ML
INJECTION, POWDER, FOR SOLUTION INTRAMUSCULAR; INTRAVENOUS AS NEEDED
Status: DISCONTINUED | OUTPATIENT
Start: 2017-06-26 | End: 2017-06-26 | Stop reason: HOSPADM

## 2017-06-26 RX ORDER — CYCLOPENTOLATE HYDROCHLORIDE 20 MG/ML
2 SOLUTION/ DROPS OPHTHALMIC 2 TIMES DAILY
COMMUNITY

## 2017-06-26 RX ORDER — ALBUTEROL SULFATE 0.83 MG/ML
2.5 SOLUTION RESPIRATORY (INHALATION) AS NEEDED
Status: DISCONTINUED | OUTPATIENT
Start: 2017-06-26 | End: 2017-06-27 | Stop reason: HOSPADM

## 2017-06-26 RX ORDER — PHENYLEPHRINE HYDROCHLORIDE 25 MG/ML
1 SOLUTION/ DROPS OPHTHALMIC
Status: DISCONTINUED | OUTPATIENT
Start: 2017-06-26 | End: 2017-06-26 | Stop reason: HOSPADM

## 2017-06-26 RX ADMIN — SODIUM CHLORIDE, SODIUM LACTATE, POTASSIUM CHLORIDE, AND CALCIUM CHLORIDE 50 ML/HR: 600; 310; 30; 20 INJECTION, SOLUTION INTRAVENOUS at 15:00

## 2017-06-26 RX ADMIN — CYCLOPENTOLATE HYDROCHLORIDE 1 DROP: 20 SOLUTION/ DROPS OPHTHALMIC at 17:15

## 2017-06-26 RX ADMIN — HYDROMORPHONE HYDROCHLORIDE 0.5 MG: 2 INJECTION, SOLUTION INTRAMUSCULAR; INTRAVENOUS; SUBCUTANEOUS at 19:06

## 2017-06-26 RX ADMIN — OFLOXACIN 1 DROP: 3 SOLUTION OPHTHALMIC at 16:45

## 2017-06-26 RX ADMIN — PHENYLEPHRINE HYDROCHLORIDE 1 DROP: 25 SOLUTION/ DROPS OPHTHALMIC at 17:20

## 2017-06-26 RX ADMIN — TROPICAMIDE 1 DROP: 10 SOLUTION/ DROPS OPHTHALMIC at 17:00

## 2017-06-26 RX ADMIN — ONDANSETRON 4 MG: 2 INJECTION INTRAMUSCULAR; INTRAVENOUS at 18:28

## 2017-06-26 RX ADMIN — TROPICAMIDE 1 DROP: 10 SOLUTION/ DROPS OPHTHALMIC at 17:10

## 2017-06-26 RX ADMIN — LIDOCAINE HYDROCHLORIDE 100 MG: 20 INJECTION, SOLUTION EPIDURAL; INFILTRATION; INTRACAUDAL; PERINEURAL at 17:30

## 2017-06-26 RX ADMIN — DEXAMETHASONE SODIUM PHOSPHATE 4 MG: 4 INJECTION, SOLUTION INTRA-ARTICULAR; INTRALESIONAL; INTRAMUSCULAR; INTRAVENOUS; SOFT TISSUE at 17:35

## 2017-06-26 RX ADMIN — PHENYLEPHRINE HYDROCHLORIDE 1 DROP: 25 SOLUTION/ DROPS OPHTHALMIC at 17:10

## 2017-06-26 RX ADMIN — PROPOFOL 100 MG: 10 INJECTION, EMULSION INTRAVENOUS at 17:32

## 2017-06-26 RX ADMIN — PROPOFOL 200 MG: 10 INJECTION, EMULSION INTRAVENOUS at 17:30

## 2017-06-26 RX ADMIN — OXYCODONE HYDROCHLORIDE 10 MG: 5 TABLET ORAL at 19:56

## 2017-06-26 RX ADMIN — TROPICAMIDE 1 DROP: 10 SOLUTION/ DROPS OPHTHALMIC at 17:20

## 2017-06-26 RX ADMIN — CYCLOPENTOLATE HYDROCHLORIDE 1 DROP: 20 SOLUTION/ DROPS OPHTHALMIC at 16:45

## 2017-06-26 RX ADMIN — ONDANSETRON 4 MG: 2 INJECTION INTRAMUSCULAR; INTRAVENOUS at 19:06

## 2017-06-26 RX ADMIN — PHENYLEPHRINE HYDROCHLORIDE 1 DROP: 25 SOLUTION/ DROPS OPHTHALMIC at 17:00

## 2017-06-26 RX ADMIN — CYCLOPENTOLATE HYDROCHLORIDE 1 DROP: 20 SOLUTION/ DROPS OPHTHALMIC at 17:00

## 2017-06-26 RX ADMIN — OFLOXACIN 1 DROP: 3 SOLUTION OPHTHALMIC at 17:15

## 2017-06-26 NOTE — IP AVS SNAPSHOT
Summary of Care Report The Summary of Care report has been created to help improve care coordination. Users with access to [facility] or [address] (Web-based application) may access additional patient information including the Discharge Summary. If you are not currently a [address] user and need more information, please call the number listed below in the [place] [address] section and ask to be connected with Medical Records. Facility Information Name Address Phone [address] [address] [address] [phone] Patient Information Patient Name Sex THUY Zarate ([id number]) Male 1985 Discharge Information Admitting Provider Service Area Unit Bethany Weeks MD / [address] [facility] [address] [facility] / [phone] Discharge Provider Discharge Date/Time Discharge Disposition Destination (none) (none) (none) (none) Patient Language Language ENGLISH [13] Hospital Problems as of 2017  Reviewed: 2017  6:48 AM by Katherine Branch MD  
 None Non-Hospital Problems as of 2017  Reviewed: 2017  6:48 AM by Katherine Branch MD  
 None You are allergic to the following Allergen Reactions Amoxil (Amoxicillin) Nausea and Vomiting Current Discharge Medication List  
  
ASK your doctor about these medications Dose & Instructions Dispensing Information Comments  
 cyclopentolate 2 % ophthalmic solution Commonly known as:  CYCLOGYL Dose:  2 Drop Administer 2 Drops to right eye two (2) times a day. Refills:  0 NORCO 5-325 mg per tablet Generic drug:  HYDROcodone-acetaminophen Dose:  2 Tab Take 2 Tabs by mouth every six (6) hours as needed for Pain. Indications: Pain Refills:  0  
   
 ofloxacin 0.3 % ophthalmic solution Commonly known as:  FLOXIN Dose:  3 Drop Administer 3 Drops to right eye four (4) times daily. Refills:  0  
   
 prednisoLONE acetate 1 % ophthalmic suspension Commonly known as:  PRED FORTE Dose:  1 Drop Administer 1 Drop to right eye four (4) times daily. Refills:  0 Surgery Information ID Date/Time Status Primary Surgeon All Procedures Location [id number] 6/26/2017 1730 Unposted Jeane Garcia III, MD VITRECTOMY POSTERIOR 25 GAUGE LASER/ SCAR TISSUE REMOVAL/ SILICONE OIL PLACEMENT/ RIGHT SFD MAIN OR Follow-up Information None Discharge Instructions [facility] [place] [place] MD Celena Tavares MD Clinton Humphrey. MD Mark Burgos. [employer] [place], [address] [employer] [address] or [phone] or [phone] or [phone] Post Operative Instructions for Retina and Vitreous Surgery Patients The following are a few guidelines that you should observe for two weeks after surgery: 1. Avoid stooping over, lifting heavy weights or bumping your head. 2.  Special positioning: May sit up but keep face down. Rest on right side with face turned down 3. No extensive traveling except what is necessary. If a gas air bubble was put in your eye at surgery - avoid air travel until you consult your doctor. 4.  You may shave after the third day. 5.  You may watch television, read, cook and wash dishes as long as it does not interfere with special positioning instructions. 6.  Alcoholic beverages may be used in moderation. 7.  No sexual intercourse. 8.  You  may bathe the eyelids daily with cotton or a tissue moistened with warm tap water. Do not bathe the eyeball itself. 9.  Some discharge from the operated eye is to be expected. This should gradually improve. 10. Change the eye pad at least once daily.   You may discontinue the eye pad whenever comfortable to do so (usually three days after the operation). Wear the eye shield or 
      glasses at all times. 11. If you experience increasing pain, decreasing vision, or pus like discharge, call the Office. 12. Medication Instructions: 
       Prednisolone 1% - one drop in the operated eye __4____ times a day. Atropine 1% - one drop in the operated eye at bedtime. Tobradex Ointment - apply in operated eye 4 times a day as needed. _Ofloxacin_____(antibiotic drop) - one drop in operated eye 4 times a day. BRING ALL EYE DROPS TO YOU POSTOPERATIVE APPOINTMENT! 13. Return appointment at the [address] [place] [address] On Tuesday June 27 at 12:35 PM 
 
Voiced or demonstrated understanding:  Jamaica Lancaster from Nurse PATIENT INSTRUCTIONS: 
 
After general anesthesia or intravenous sedation, for 24 hours or while taking prescription Narcotics: · Limit your activities · Do not drive and operate hazardous machinery · Do not make important personal or business decisions · Do  not drink alcoholic beverages · If you have not urinated within 8 hours after discharge, please contact your surgeon on call. *  Please give a list of your current medications to your Primary Care Provider. *  Please update this list whenever your medications are discontinued, doses are 
    changed, or new medications (including over-the-counter products) are added. *  Please carry medication information at all times in case of emergency situations. These are general instructions for a healthy lifestyle: No smoking/ No tobacco products/ Avoid exposure to second hand smoke Surgeon General's Warning:  Quitting smoking now greatly reduces serious risk to your health. Obesity, smoking, and sedentary lifestyle greatly increases your risk for illness A healthy diet, regular physical exercise & weight monitoring are important for maintaining a healthy lifestyle You may be retaining fluid if you have a history of heart failure or if you experience any of the following symptoms:  Weight gain of 3 pounds or more overnight or 5 pounds in a week, increased swelling in our hands or feet or shortness of breath while lying flat in bed. Please call your doctor as soon as you notice any of these symptoms; do not wait until your next office visit. Recognize signs and symptoms of STROKE: 
 
F-face looks uneven A-arms unable to move or move unevenly S-speech slurred or non-existent T-time-call 911 as soon as signs and symptoms begin-DO NOT go Back to bed or wait to see if you get better-TIME IS BRAIN. Chart Review Routing History Recipient Method Report Sent By Page Weeks MD  
Fax: [phone] Phone: [phone] Fax IP Auto Routed Keith King MD [[id number]] 6/20/2017  9:24 AM 06/20/2017 Sukhwinder Wall MD  
Fax: [phone] Phone: [phone] Fax IP Auto Routed Keith King MD [[id number]] 6/20/2017  9:24 AM 06/20/2017

## 2017-06-26 NOTE — IP AVS SNAPSHOT
[facility] 
 
 
 [address] [facility] [address] 
[phone] Patient: Raghu Fiore MRN: [id number] :1985 You are allergic to the following Allergen Reactions Amoxil (Amoxicillin) Nausea and Vomiting Recent Documentation Height Weight BMI Smoking Status 1.829 m 100.8 kg 30.15 kg/m2 Current Every Day Smoker Emergency Contacts Name Discharge Info Relation Home Work Mobile [address]  Parent [1] [phone] About your hospitalization You were admitted on:  2017 You last received care in the:  [facility] PACU You were discharged on:  2017 Unit phone number:  [phone] Why you were hospitalized Your primary diagnosis was:  Not on File Providers Seen During Your Hospitalizations Provider Role Specialty Primary office phone Wesley Gonzalez MD Attending Provider Ophthalmology [phone] Your Primary Care Physician (PCP) Primary Care Physician Office Phone Office Fax OTHER, PHYS ** None ** ** None ** Follow-up Information None Current Discharge Medication List  
  
ASK your doctor about these medications Dose & Instructions Dispensing Information Comments Morning Noon Evening Bedtime  
 cyclopentolate 2 % ophthalmic solution Commonly known as:  CYCLOGYL Your last dose was: Your next dose is:    
   
   
 Dose:  2 Drop Administer 2 Drops to right eye two (2) times a day. Refills:  0 NORCO 5-325 mg per tablet Generic drug:  HYDROcodone-acetaminophen Your last dose was: Your next dose is:    
   
   
 Dose:  2 Tab Take 2 Tabs by mouth every six (6) hours as needed for Pain. Indications: Pain Refills:  0  
     
   
   
   
  
 ofloxacin 0.3 % ophthalmic solution Commonly known as:  FLOXIN Your last dose was: Your next dose is:    
   
   
 Dose:  3 Drop Administer 3 Drops to right eye four (4) times daily. Refills:  0  
     
   
   
   
  
 prednisoLONE acetate 1 % ophthalmic suspension Commonly known as:  PRED FORTE Your last dose was: Your next dose is:    
   
   
 Dose:  1 Drop Administer 1 Drop to right eye four (4) times daily. Refills:  0 Discharge Instructions [facility] [place], [place] MD Sukhdev Montoya MD Freddy Moor. MD Thor Enaomrado. Cayla Pineda, [address] [facility] or [phone] or [phone] or [phone] Post Operative Instructions for Retina and Vitreous Surgery Patients The following are a few guidelines that you should observe for two weeks after surgery: 1. Avoid stooping over, lifting heavy weights or bumping your head. 2.  Special positioning: May sit up but keep face down. Rest on right side with face turned down 3. No extensive traveling except what is necessary. If a gas air bubble was put in your eye at surgery - avoid air travel until you consult your doctor. 4.  You may shave after the third day. 5.  You may watch television, read, cook and wash dishes as long as it does not interfere with special positioning instructions. 6.  Alcoholic beverages may be used in moderation. 7.  No sexual intercourse. 8.  You  may bathe the eyelids daily with cotton or a tissue moistened with warm tap water. Do not bathe the eyeball itself. 9.  Some discharge from the operated eye is to be expected. This should gradually improve. 10. Change the eye pad at least once daily. You may discontinue the eye pad whenever comfortable to do so (usually three days after the operation). Wear the eye shield or 
      glasses at all times. 11. If you experience increasing pain, decreasing vision, or pus like discharge, call the Office. 12. Medication Instructions: 
       Prednisolone 1% - one drop in the operated eye __4____ times a day. Atropine 1% - one drop in the operated eye at bedtime. Tobradex Ointment - apply in operated eye 4 times a day as needed. _Ofloxacin_____(antibiotic drop) - one drop in operated eye 4 times a day. BRING ALL EYE DROPS TO YOU POSTOPERATIVE APPOINTMENT! 13. Return appointment at the [address] [place] [address] On Tuesday June 27 at 12:35 PM 
 
Voiced or demonstrated understanding:  Radha Bernal from Nurse PATIENT INSTRUCTIONS: 
 
After general anesthesia or intravenous sedation, for 24 hours or while taking prescription Narcotics: · Limit your activities · Do not drive and operate hazardous machinery · Do not make important personal or business decisions · Do  not drink alcoholic beverages · If you have not urinated within 8 hours after discharge, please contact your surgeon on call. *  Please give a list of your current medications to your Primary Care Provider. *  Please update this list whenever your medications are discontinued, doses are 
    changed, or new medications (including over-the-counter products) are added. *  Please carry medication information at all times in case of emergency situations. These are general instructions for a healthy lifestyle: No smoking/ No tobacco products/ Avoid exposure to second hand smoke Surgeon General's Warning:  Quitting smoking now greatly reduces serious risk to your health. Obesity, smoking, and sedentary lifestyle greatly increases your risk for illness A healthy diet, regular physical exercise & weight monitoring are important for maintaining a healthy lifestyle You may be retaining fluid if you have a history of heart failure or if you experience any of the following symptoms:  Weight gain of 3 pounds or more overnight or 5 pounds in a week, increased swelling in our hands or feet or shortness of breath while lying flat in bed. Please call your doctor as soon as you notice any of these symptoms; do not wait until your next office visit. Recognize signs and symptoms of STROKE: 
 
F-face looks uneven A-arms unable to move or move unevenly S-speech slurred or non-existent T-time-call 911 as soon as signs and symptoms begin-DO NOT go Back to bed or wait to see if you get better-TIME IS BRAIN. Discharge Orders None Introducing [facility]! Sandor Stark introduces [employer] patient portal. Now you can access parts of your medical record, email your doctor's office, and request medication refills online. 1. In your internet browser, go to [email or web address] 2. Click on the First Time User? Click Here link in the Sign In box. You will see the New Member Sign Up page. 3. Enter your [employer] Access Code exactly as it appears below. You will not need to use this code after youve completed the sign-up process. If you do not sign up before the expiration date, you must request a new code. · [employer] Access Code: [email or web address] Expires: 9/4/2017  9:27 AM 
 
4. Enter the last four digits of your Social Security Number (xxxx) and Date of Birth (mm/dd/yyyy) as indicated and click Submit. You will be taken to the next sign-up page. 5. Create a [employer] ID. This will be your [employer] login ID and cannot be changed, so think of one that is secure and easy to remember. 6. Create a [employer] password. You can change your password at any time. 7. Enter your Password Reset Question and Answer. This can be used at a later time if you forget your password. 8. Enter your e-mail address. You will receive e-mail notification when new information is available in [address] 9. Click Sign Up. You can now view and download portions of your medical record. 10. Click the Download Summary menu link to download a portable copy of your medical information. If you have questions, please visit the Frequently Asked Questions section of the [employer] website. Remember, [employer] is NOT to be used for urgent needs. For medical emergencies, dial 911. Now available from your iPhone and Android! General Information Please provide this summary of care documentation to your next provider. Patient Signature:  ____________________________________________________________ Date:  ____________________________________________________________  
  
Kennedy Quintanilla Provider Signature:  ____________________________________________________________ Date:  ____________________________________________________________

## 2017-06-26 NOTE — ANESTHESIA POSTPROCEDURE EVALUATION
Post-Anesthesia Evaluation and Assessment    Patient: Arnold Nicole MRN: [id number]  SSN: [id number]    YOB: 1985  Age: 28 y.o. Sex: male       Cardiovascular Function/Vital Signs  Visit Vitals    /74    Pulse 75    Temp 37.3 °C (99.1 °F)    Resp 17    Ht 6' (1.829 m)    Wt 100.8 kg (222 lb 4.8 oz)    SpO2 97%    BMI 30.15 kg/m2       Patient is status post general anesthesia for Procedure(s):  VITRECTOMY POSTERIOR 25 GAUGE LASER/ SCAR TISSUE REMOVAL/ SILICONE OIL PLACEMENT/ RIGHT. Nausea/Vomiting: None    Postoperative hydration reviewed and adequate. Pain:  Pain Scale 1: Numeric (0 - 10) (06/26/17 1438)  Pain Intensity 1: 2 (06/26/17 1438)   Managed    Neurological Status:   Neuro (WDL): Within Defined Limits (06/26/17 1440)   At baseline    Mental Status and Level of Consciousness: Arousable    Pulmonary Status:   O2 Device: Nasal cannula (06/26/17 1853)   Adequate oxygenation and airway patent    Complications related to anesthesia: None    Post-anesthesia assessment completed.  No concerns    Signed By: Elvin Negron MD     June 26, 2017

## 2017-06-26 NOTE — ANESTHESIA PREPROCEDURE EVALUATION
Anesthetic History               Review of Systems / Medical History  Patient summary reviewed, nursing notes reviewed and pertinent labs reviewed    Pulmonary    COPD (Chronic bronchitis)      Smoker         Neuro/Psych              Cardiovascular                  Exercise tolerance: >4 METS     GI/Hepatic/Renal                Endo/Other             Other Findings              Physical Exam    Airway  Mallampati: I  TM Distance: > 6 cm  Neck ROM: normal range of motion   Mouth opening: Normal     Cardiovascular  Regular rate and rhythm,  S1 and S2 normal,  no murmur, click, rub, or gallop             Dental  No notable dental hx    Comments:  Many missing teeth   Pulmonary  Breath sounds clear to auscultation               Abdominal  GI exam deferred       Other Findings            Anesthetic Plan    ASA: 2  Anesthesia type: general            Anesthetic plan and risks discussed with: Patient and Mother    Girlfriend present

## 2017-06-26 NOTE — BRIEF OP NOTE
BRIEF OPERATIVE NOTE    Date of Procedure: 6/26/2017   Preoperative Diagnosis: Retinal detachment of right eye with multiple breaks [H33.021] Proliferative vitreoretinopathy right eye  Postoperative Diagnosis: Retinal detachment of right eye with multiple breaks [H33.021] Proliferative vitreoretinopathy right eye   Procedure(s):  VITRECTOMY POSTERIOR 25 GAUGE LASER/ SCAR TISSUE REMOVAL/RETINECTOMY/ SILICONE OIL PLACEMENT/ RIGHT  Surgeon(s) and Role:     * Wesley Gonzalez MD - Primary         Assistant Staff:       Surgical Staff:  Circ-1: Mark Higgins  Scrub Tech-1: Genie Arce  Event Time In   Incision Start 1748   Incision Close 1839     Anesthesia: General   Estimated Blood Loss: None  Specimens: * No specimens in log *   Findings: 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

## 2017-06-27 NOTE — DISCHARGE INSTRUCTIONS
[facility], [address] MD Fadumo Curry MD Vassie Fisherman. MD Misa Ponce. Dimitri Payne MD    [phone] or [phone] or [phone] or [phone]    Post Operative Instructions for Retina and Vitreous Surgery Patients    The following are a few guidelines that you should observe for two weeks after surgery:    1. Avoid stooping over, lifting heavy weights or bumping your head. 2.  Special positioning: May sit up but keep face down. Rest on right side with face turned down    3. No extensive traveling except what is necessary. If a gas air bubble was put in your eye at surgery - avoid air travel until you consult your doctor. 4.  You may shave after the third day. 5.  You may watch television, read, cook and wash dishes as long as it does not interfere with special positioning instructions. 6.  Alcoholic beverages may be used in moderation. 7.  No sexual intercourse. 8.  You  may bathe the eyelids daily with cotton or a tissue moistened with warm tap water. Do not bathe the eyeball itself. 9.  Some discharge from the operated eye is to be expected. This should gradually improve. 10. Change the eye pad at least once daily. You may discontinue the eye pad whenever comfortable to do so (usually three days after the operation). Wear the eye shield or        glasses at all times. 11. If you experience increasing pain, decreasing vision, or pus like discharge, call the Office. 12. Medication Instructions:         Prednisolone 1% - one drop in the operated eye __4____ times a day. Atropine 1% - one drop in the operated eye at bedtime. Tobradex Ointment - apply in operated eye 4 times a day as needed. _Ofloxacin_____(antibiotic drop) - one drop in operated eye 4 times a day.          BRING ALL EYE DROPS TO YOU POSTOPERATIVE APPOINTMENT! 13. Return appointment at the [facility]        On Tuesday June 27 at 12:35 PM    Voiced or demonstrated understanding:  [address] from Nurse    PATIENT INSTRUCTIONS:    After general anesthesia or intravenous sedation, for 24 hours or while taking prescription Narcotics:  · Limit your activities  · Do not drive and operate hazardous machinery  · Do not make important personal or business decisions  · Do  not drink alcoholic beverages  · If you have not urinated within 8 hours after discharge, please contact your surgeon on call. *  Please give a list of your current medications to your Primary Care Provider. *  Please update this list whenever your medications are discontinued, doses are      changed, or new medications (including over-the-counter products) are added. *  Please carry medication information at all times in case of emergency situations. These are general instructions for a healthy lifestyle:    No smoking/ No tobacco products/ Avoid exposure to second hand smoke    Surgeon General's Warning:  Quitting smoking now greatly reduces serious risk to your health. Obesity, smoking, and sedentary lifestyle greatly increases your risk for illness    A healthy diet, regular physical exercise & weight monitoring are important for maintaining a healthy lifestyle    You may be retaining fluid if you have a history of heart failure or if you experience any of the following symptoms:  Weight gain of 3 pounds or more overnight or 5 pounds in a week, increased swelling in our hands or feet or shortness of breath while lying flat in bed. Please call your doctor as soon as you notice any of these symptoms; do not wait until your next office visit.     Recognize signs and symptoms of STROKE:    F-face looks uneven    A-arms unable to move or move unevenly    S-speech slurred or non-existent    T-time-call 911 as soon as signs and symptoms begin-DO NOT go       Back to bed or wait to see if you get better-TIME IS BRAIN.

## 2017-06-27 NOTE — OP NOTES
[facility]   OPERATIVE REPORT       Name:  Jory Umaña   MR#:  [id number]   :  1985   Account #:  [de-identified]   Date of Adm:  2017       DATE OF SURGERY: 2017    PREOPERATIVE DIAGNOSES   1. 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   2. 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 POSTOPERATIVE DIAGNOSES   1. 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   2. 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 PROCEDURES PERFORMED: Repair of retinal detachment of the right   eye by vitrectomy, retinectomy, laser, and placement of silicone   oil. SURGEON: Gala Goodman. Radha VAZQUEZ MD    ANESTHESIA: General.    COMPLICATIONS: None. INDICATIONS FOR SURGERY: The patient has experienced a recurrent   retinal detachment of the right eye, resulting from both   preretinal and subretinal proliferative vitreal retinopathy. Surgery was recommended to treat these conditions in order to   restore vision. The risks and benefits of the surgical   procedures were thoroughly reviewed and the patient wished to   proceed. SUMMARY OF PROCEDURE: After appropriate preoperative evaluation   and signing of the operative permit, the patient was taken to   the operating room and placed in a supine fashion upon the   procedure table. A time-out was then performed and all operating   room personnel confirmed the patient's identity, the surgical   site, and the procedures to be performed. The patient was then   placed under general anesthesia and a retrobulbar injection was   given on the right consisting of 5 mL of a 50/50 solution of 2%   Xylocaine and 0.75% Marcaine. The right eye was then carefully   draped and prepped in a sterile ophthalmic fashion with a 5%   Betadine solution. The conjunctiva and periorbital tissues were   carefully prepped.     A 25-gauge, 4 mm infusion cannula was carefully placed 4 mm   posterior to the limbus at the 7 o'clock position. Once the tip   of the cannula was visualized and posterior segment found to be   clear, it was turned to the on position and the intraocular   pressure was adjusted to 30 mmHg. Two additional cannulas were   placed at the 10 and 2 o'clock positions, 4 mm posterior to the   limbus. The handheld fiberoptic tube was inserted in the nasal cannula   site while the Microvit instrument was inserted in the temporal   cannula site. Visualization of the posterior segment was   achieved using the overhead microscope and the hand-held contact   lens. Examination of the posterior segment confirmed a bullous   inferior retinal detachment extending from the 3 o'clock   position to the 7 o'clock position. The macula was partially   detached. Subretinal bands were present inferior nasal to the   disk. An atrophic retinal break was opened between the 4 and 5   o'clock positions with subretinal fibrosis present. The Tricia Hurt was then used to remove remaining contracted   vitreous in the inferior nasal and inferior temporal periphery. Once this was completed, the internal diathermy instrument was   used to dawood the area of retinectomy extending from the 3   o'clock to the 7 o'clock positions. The Tricia Hurt was then used   to carefully fashion the retinectomy, releasing all traction to   the retinal surface. No significant hemorrhage occurred during   this process. An air-fluid exchange was then performed and the   retina was noted to lie flat against the retinal pigment   epithelium. All subretinal fluid was carefully removed with the   back-flush needle. Once this was completed, the indirect laser   was used to apply several rows of laser treatment posterior to   the retinectomy sites. This laser extended from the 2 o'clock   position to the 8 o'clock position in a clockwise fashion. A   total of approximately 675 spots were placed.  The remaining   fluid was then removed from the retinal surface with the back-  flush needle. One thousand centistoke silicone oil was then   instilled through the superior temporal cannula site. The entire   posterior segment was carefully filled with silicone oil, with   care not to perform over-filling. Once the entire posterior   segment was filled and the oil confirmed to be in proper   position by indirect ophthalmoscopy, each cannula was carefully   removed. The cannula sites were then closed with 8-0 Vicryl   suture. Also, the conjunctiva was opened superotemporally, and   this site was closed with 6-0 plain gut suture. No leakage was   noted from the sites. The final intraocular pressure was noted   to be approximately 15 mmHg. A subconjunctival injection   consisting of 0.5 mL of Ancef and 0.5 mL of betamethasone was   given in the inferior temporal quadrant. Pred Forte, ofloxacin,   Cyclogyl, and neomycin ointment were placed in the inferior   fornix. A patch and shield were then placed over the right eye. The patient tolerated the procedure well and was taken to the   recovery room for routine postoperative care.         MD TRINH Bales III / TIANNA   D:  06/26/2017   18:53   T:  06/27/2017   06:47   Job #:  [id number]

## (undated) DEVICE — SOLUTION IRRIGATION BAL SALT SOLUTION 500 ML BTL 6/CA BSS +

## (undated) DEVICE — SUTURE ABSORBABLE MONOFILAMENT 6-0 G-1 18 IN PLN GUT 770G

## (undated) DEVICE — SUT VCRL 8-0 12IN TG1408 VIO --

## (undated) DEVICE — COVER,MAYO STAND,STERILE: Brand: MEDLINE

## (undated) DEVICE — NEEDLE HYPO 25GA L5/8IN ORNG HUB S STL LATCH BVL UP

## (undated) DEVICE — 3M™ TEGADERM™ TRANSPARENT FILM DRESSING FRAME STYLE, 1626W, 4 IN X 4-3/4 IN (10 CM X 12 CM), 50/CT 4CT/CASE: Brand: 3M™ TEGADERM™

## (undated) DEVICE — LIGHT GLOVE: Brand: DEVON

## (undated) DEVICE — EYE SPEAR / FINE DISSECTOR: Brand: DEROYAL

## (undated) DEVICE — ADVANCED DSP BACKFLUSH BLUNT, 25G: Brand: ALCON GRIESHABER

## (undated) DEVICE — Device: Brand: MILLEX-OR

## (undated) DEVICE — VISCOUS FLUID CONTROL PAK: Brand: CONSTELLATION

## (undated) DEVICE — IRRIGATION KT OPHTH 80IN --

## (undated) DEVICE — SYR 5ML 1/5 GRAD LL NSAF LF --

## (undated) DEVICE — 25+® REVOLUTION DSP ILM FORCEPS: Brand: ALCON GRIESHABER REVOLUTION 25+

## (undated) DEVICE — SYR 50ML LR LCK 1ML GRAD NSAF --

## (undated) DEVICE — CONSTELLATION VISION SYSTEM 5000 CPM ULTRAVIT PROBE STRAIGHT ENDOILLUMINATOR 25+ TOTALPLUS VITRECTOMY PAK EDGEPLUS BLADE VALVED ENTRY SYSTEM: Brand: CONSTELLATION, ULTRAVIT, 25+, TOTALPLUS, EDGEPLUS

## (undated) DEVICE — Device

## (undated) DEVICE — PAD,EYE,1-5/8X2 5/8,STERILE,LF,1/PK: Brand: MEDLINE

## (undated) DEVICE — (D)STRIP SKN CLSR 0.5X4IN WHT --

## (undated) DEVICE — Z DISCONTINUED NO SUB IDED SHIELD EYE PLAS RT BGE M 7.5CMX5.7CM VISITEC

## (undated) DEVICE — NEEDLE HYPO 27GA L1.25IN GRY POLYPR HUB S STL REG BVL STR

## (undated) DEVICE — BIPOLAR FORCEPS CORD,BANANA LEADS: Brand: VALLEYLAB

## (undated) DEVICE — SURGICAL PROCEDURE PACK EYE CDS

## (undated) DEVICE — KENDALL SCD EXPRESS SLEEVES, KNEE LENGTH, MEDIUM: Brand: KENDALL SCD

## (undated) DEVICE — SOLUTION IRRIGATION BAL SALT SOLUTION 500 ML STRL BSS

## (undated) DEVICE — DISPOSABLE BIPOLAR PENCIL 5" (12.7CM) 25 GAUGE STRAIGHT: Brand: KIRWAN

## (undated) DEVICE — SOLUTION IRRIG 1000ML H2O STRL BLT